# Patient Record
Sex: MALE | URBAN - METROPOLITAN AREA
[De-identification: names, ages, dates, MRNs, and addresses within clinical notes are randomized per-mention and may not be internally consistent; named-entity substitution may affect disease eponyms.]

---

## 2023-03-20 ENCOUNTER — TELEPHONE (OUTPATIENT)
Dept: CARDIOLOGY CLINIC | Age: 73
End: 2023-03-20

## 2023-03-20 NOTE — TELEPHONE ENCOUNTER
Patient was referred by Dr. Terra Yoo  to the [facility] location with Dr. Jorge Lopez. Patient has been scheduled for 03/30 at 2:00pm (am/pm). Patient verbalized understanding of appointment instructions. Call complete.

## 2023-03-27 NOTE — PROGRESS NOTES
Historical Provider, MD   nitroGLYCERIN (NITROSTAT) 0.4 MG SL tablet Place 0.4 mg under the tongue every 5 minutes as needed 3/27/23  Yes Historical Provider, MD   albuterol sulfate HFA (PROVENTIL;VENTOLIN;PROAIR) 108 (90 Base) MCG/ACT inhaler Inhale 2 puffs into the lungs every 6 hours as needed 2/16/17  Yes Historical Provider, MD   albuterol (PROVENTIL) (2.5 MG/3ML) 0.083% nebulizer solution Inhale 2.5 mg into the lungs every 6 hours as needed 2/16/17  Yes Historical Provider, MD   fluticasone-salmeterol (ADVAIR) 250-50 MCG/ACT AEPB diskus inhaler Inhale 1 puff into the lungs as needed 1/7/19  Yes Historical Provider, MD   HYDROcodone-acetaminophen (NORCO) 5-325 MG per tablet Take 1 tablet by mouth every 6 hours as needed. Yes Historical Provider, MD   meclizine (ANTIVERT) 25 MG tablet Take 25 mg by mouth every 6 hours as needed 2/2/19  Yes Historical Provider, MD   metFORMIN (GLUCOPHAGE) 1000 MG tablet Take 1,000 mg by mouth daily (with breakfast) 9/8/20  Yes Historical Provider, MD   esomeprazole (NEXIUM) 40 MG delayed release capsule Take 1 capsule by mouth daily 12/30/19  Yes Historical Provider, MD        Allergies:  Patient has no known allergies. Review of Systems:   Constitutional: there has been no unanticipated weight loss. There's been no change in energy level, sleep pattern, or activity level. Eyes: No visual changes or diplopia. No scleral icterus. ENT: No Headaches, hearing loss or vertigo. No mouth sores or sore throat. Cardiovascular: Reviewed in HPI  Respiratory: No cough or wheezing, no sputum production. No hematemesis. Gastrointestinal: No abdominal pain, appetite loss, blood in stools. No change in bowel or bladder habits. Genitourinary: No dysuria, trouble voiding, or hematuria. Musculoskeletal:  No gait disturbance, weakness or joint complaints. Integumentary: No rash or pruritis. Neurological: No headache, diplopia, change in muscle strength, numbness or tingling.  No

## 2023-03-30 ENCOUNTER — TELEPHONE (OUTPATIENT)
Dept: CARDIOLOGY CLINIC | Age: 73
End: 2023-03-30

## 2023-03-30 ENCOUNTER — OFFICE VISIT (OUTPATIENT)
Dept: CARDIOLOGY CLINIC | Age: 73
End: 2023-03-30
Payer: MEDICARE

## 2023-03-30 VITALS
HEART RATE: 55 BPM | SYSTOLIC BLOOD PRESSURE: 132 MMHG | WEIGHT: 206 LBS | OXYGEN SATURATION: 98 % | DIASTOLIC BLOOD PRESSURE: 64 MMHG

## 2023-03-30 DIAGNOSIS — E78.5 HYPERLIPIDEMIA, UNSPECIFIED HYPERLIPIDEMIA TYPE: ICD-10-CM

## 2023-03-30 DIAGNOSIS — I10 HYPERTENSION, UNSPECIFIED TYPE: ICD-10-CM

## 2023-03-30 DIAGNOSIS — I25.83 CORONARY ARTERY DISEASE DUE TO LIPID RICH PLAQUE: ICD-10-CM

## 2023-03-30 DIAGNOSIS — I25.10 CORONARY ARTERY DISEASE DUE TO LIPID RICH PLAQUE: ICD-10-CM

## 2023-03-30 DIAGNOSIS — R55 SYNCOPE, UNSPECIFIED SYNCOPE TYPE: Primary | ICD-10-CM

## 2023-03-30 PROCEDURE — 3075F SYST BP GE 130 - 139MM HG: CPT | Performed by: INTERNAL MEDICINE

## 2023-03-30 PROCEDURE — 99204 OFFICE O/P NEW MOD 45 MIN: CPT | Performed by: INTERNAL MEDICINE

## 2023-03-30 PROCEDURE — 1123F ACP DISCUSS/DSCN MKR DOCD: CPT | Performed by: INTERNAL MEDICINE

## 2023-03-30 PROCEDURE — 93000 ELECTROCARDIOGRAM COMPLETE: CPT | Performed by: INTERNAL MEDICINE

## 2023-03-30 PROCEDURE — 3078F DIAST BP <80 MM HG: CPT | Performed by: INTERNAL MEDICINE

## 2023-03-30 RX ORDER — ALBUTEROL SULFATE 2.5 MG/3ML
2.5 SOLUTION RESPIRATORY (INHALATION) EVERY 6 HOURS PRN
COMMUNITY
Start: 2017-02-16

## 2023-03-30 RX ORDER — MECLIZINE HCL 12.5 MG/1
12.5 TABLET ORAL 3 TIMES DAILY PRN
COMMUNITY
Start: 2019-03-19 | End: 2023-03-30

## 2023-03-30 RX ORDER — ALBUTEROL SULFATE 90 UG/1
2 AEROSOL, METERED RESPIRATORY (INHALATION) EVERY 6 HOURS PRN
COMMUNITY
Start: 2017-02-16

## 2023-03-30 RX ORDER — ASPIRIN 81 MG/1
81 TABLET ORAL DAILY
COMMUNITY
Start: 2016-02-24

## 2023-03-30 RX ORDER — ESOMEPRAZOLE MAGNESIUM 40 MG/1
1 CAPSULE, DELAYED RELEASE ORAL DAILY
COMMUNITY
Start: 2019-12-30

## 2023-03-30 RX ORDER — MECLIZINE HYDROCHLORIDE 25 MG/1
25 TABLET ORAL EVERY 6 HOURS PRN
COMMUNITY
Start: 2019-02-02

## 2023-03-30 RX ORDER — NITROGLYCERIN 0.4 MG/1
0.4 TABLET SUBLINGUAL EVERY 5 MIN PRN
COMMUNITY
Start: 2023-03-27

## 2023-03-30 RX ORDER — HYDROCODONE BITARTRATE AND ACETAMINOPHEN 5; 325 MG/1; MG/1
1 TABLET ORAL EVERY 6 HOURS PRN
COMMUNITY

## 2023-03-30 RX ORDER — ATORVASTATIN CALCIUM 40 MG/1
40 TABLET, FILM COATED ORAL NIGHTLY
COMMUNITY
Start: 2023-03-27

## 2023-03-30 RX ORDER — FLUTICASONE PROPIONATE AND SALMETEROL 250; 50 UG/1; UG/1
1 POWDER RESPIRATORY (INHALATION) PRN
COMMUNITY
Start: 2019-01-07

## 2023-03-30 NOTE — PATIENT INSTRUCTIONS
30 day heart monitor  Continue risk factor modifications. Call for any change in symptoms, call to report any changes in shortness of breath or development of chest pain with activity.     Follow up in 3 mos

## 2023-03-31 NOTE — TELEPHONE ENCOUNTER
Called Danielle ([phone]) medical records for [facility]. Awaiting report to be faxed.

## 2023-04-03 ENCOUNTER — TELEPHONE (OUTPATIENT)
Dept: CARDIOLOGY CLINIC | Age: 73
End: 2023-04-03

## 2023-04-03 PROBLEM — E78.5 HLD (HYPERLIPIDEMIA): Status: ACTIVE | Noted: 2023-04-03

## 2023-04-03 PROBLEM — R42 DIZZINESS: Status: ACTIVE | Noted: 2023-04-03

## 2023-04-03 PROBLEM — I10 HTN (HYPERTENSION): Status: ACTIVE | Noted: 2023-04-03

## 2023-04-03 PROBLEM — I25.10 CAD (CORONARY ARTERY DISEASE): Status: ACTIVE | Noted: 2023-04-03

## 2023-04-03 PROBLEM — R55 SYNCOPE: Status: ACTIVE | Noted: 2023-04-03

## 2023-04-03 NOTE — TELEPHONE ENCOUNTER
Spoke to Rell and his wife, Zackery Guerrier. Rell is not experiencing any worsening symptoms than he's been having over the last 6 mos despite pause noted on cardiac monitor 3/31/23. Discussed with Iván Wolff RN who will schedule Rell with Dr Poonam Blanchard 4/4/23 at 1:15 pm - Rell aware and agreeable. Pt instructed to proceed to ER is any worsening symptoms occur , pt verbalizes understanding.

## 2023-04-03 NOTE — PROGRESS NOTES
Summary:   Left ventricular wall motion is normal.   Overall left ventricular ejection fraction is estimated to be 55-60%. Left atrium is mildly dilated. Mitral valve leaflets are mildly thickened in appearance. Reason for Study: GUO     Cardiac Calcium Score  03/17/2023   FINDINGS:   THORACIC AORTA:  Unremarkable. No aneurysm   Ascending Aorta: 3.5 cm  (normal is less than 4 cm)   Descending Aorta: 2.8 cm  (normal is less than 3.5 cm)     AORTIC VALVE CALCIFICATION: None   HEART/PERICARDIUM:  Unremarkable     (The following structures are only partially included on this scan)   LUNGS/AIRWAYS:  Unremarkable. No air space disease or mass   PLEURA: Unremarkable. No pleural effusion or pneumothorax   MEDIASTINUM/JO:  Unremarkable   CHEST WALL/LOWER NECK:  Unremarkable   UPPER ABDOMEN:  Unremarkable   BONES:  Unremarkable   OTHER:  None         CORONARY ARTERY CALCIUM SCORE:   ARTERY    SCORE     LM                93. 2   LAD              414.8   CX                71.3   RCA             393.4     TOTAL         972.8     REFERENCE NORMS FOR CALCIUM SCORE   Mild: 1-100   Moderate: 101-300   Severe: 301-1000   Extensive: > 1000    I independently reviewed the cardiac diagnostic studies, ECG and relevant imaging studies. Physical Examination:  Vitals:    04/04/23 1308   BP: 110/72   Pulse: 55   SpO2: 99%     Wt Readings from Last 3 Encounters:   03/30/23 206 lb (93.4 kg)     Constitutional: Oriented. No distress. Cardiovascular: Normal rate, regular rhythm, S1&S2. Pulmonary/Chest: Bilateral respiratory sounds. No rhonchi. Abdominal: Soft. No tenderness   Musculoskeletal: No edema    Neurological: Alert and oriented. Follows command    Review of System:  [x] Full ROS obtained and negative except as mentioned in HPI    Prior to Admission medications    Medication Sig Start Date End Date Taking?  Authorizing Provider   fluticasone (FLONASE) 50 MCG/ACT nasal spray 1 spray by Nasal route daily 6/20/18

## 2023-04-03 NOTE — TELEPHONE ENCOUNTER
Attempted to call patient for urgent MCOT 5.5 secondpause. Unable to reach on cell or home phone. Pia Oh RN notified.

## 2023-04-04 ENCOUNTER — OFFICE VISIT (OUTPATIENT)
Dept: CARDIOLOGY CLINIC | Age: 73
End: 2023-04-04
Payer: MEDICARE

## 2023-04-04 VITALS
OXYGEN SATURATION: 99 % | HEART RATE: 55 BPM | HEIGHT: 68 IN | WEIGHT: 202.6 LBS | DIASTOLIC BLOOD PRESSURE: 72 MMHG | BODY MASS INDEX: 30.71 KG/M2 | SYSTOLIC BLOOD PRESSURE: 110 MMHG

## 2023-04-04 DIAGNOSIS — R42 DIZZINESS: ICD-10-CM

## 2023-04-04 DIAGNOSIS — I25.10 CORONARY ARTERY DISEASE INVOLVING NATIVE HEART WITHOUT ANGINA PECTORIS, UNSPECIFIED VESSEL OR LESION TYPE: ICD-10-CM

## 2023-04-04 DIAGNOSIS — R55 SYNCOPE, UNSPECIFIED SYNCOPE TYPE: ICD-10-CM

## 2023-04-04 DIAGNOSIS — E78.5 HYPERLIPIDEMIA, UNSPECIFIED HYPERLIPIDEMIA TYPE: ICD-10-CM

## 2023-04-04 DIAGNOSIS — I10 HYPERTENSION, UNSPECIFIED TYPE: ICD-10-CM

## 2023-04-04 PROCEDURE — 1123F ACP DISCUSS/DSCN MKR DOCD: CPT | Performed by: INTERNAL MEDICINE

## 2023-04-04 PROCEDURE — 93000 ELECTROCARDIOGRAM COMPLETE: CPT | Performed by: INTERNAL MEDICINE

## 2023-04-04 PROCEDURE — 3078F DIAST BP <80 MM HG: CPT | Performed by: INTERNAL MEDICINE

## 2023-04-04 PROCEDURE — 3074F SYST BP LT 130 MM HG: CPT | Performed by: INTERNAL MEDICINE

## 2023-04-04 PROCEDURE — 99205 OFFICE O/P NEW HI 60 MIN: CPT | Performed by: INTERNAL MEDICINE

## 2023-04-04 RX ORDER — ESOMEPRAZOLE MAGNESIUM 20 MG/1
FOR SUSPENSION ORAL PRN
COMMUNITY

## 2023-04-04 RX ORDER — LISINOPRIL AND HYDROCHLOROTHIAZIDE 20; 12.5 MG/1; MG/1
TABLET ORAL
COMMUNITY
Start: 2023-03-08

## 2023-04-04 RX ORDER — SUMATRIPTAN 50 MG/1
TABLET, FILM COATED ORAL PRN
COMMUNITY

## 2023-04-04 RX ORDER — LAMOTRIGINE 25 MG/1
TABLET ORAL
COMMUNITY
Start: 2023-03-29

## 2023-04-04 RX ORDER — FLUTICASONE PROPIONATE 50 MCG
1 SPRAY, SUSPENSION (ML) NASAL DAILY
COMMUNITY
Start: 2018-06-20

## 2023-04-04 RX ORDER — TOPIRAMATE 25 MG/1
25 TABLET ORAL DAILY
Qty: 30 TABLET | Refills: 11 | COMMUNITY
Start: 2022-12-30 | End: 2023-12-30

## 2023-04-04 RX ORDER — ROSUVASTATIN CALCIUM 10 MG/1
TABLET, COATED ORAL
COMMUNITY
Start: 2023-03-08 | End: 2023-04-04 | Stop reason: DRUGHIGH

## 2023-04-04 RX ORDER — HYDRALAZINE HYDROCHLORIDE 10 MG/1
10 TABLET, FILM COATED ORAL 3 TIMES DAILY
COMMUNITY
Start: 2023-03-08

## 2023-04-04 RX ORDER — ROSUVASTATIN CALCIUM 40 MG/1
TABLET, COATED ORAL
COMMUNITY
Start: 2023-03-29 | End: 2023-04-04

## 2023-04-04 RX ORDER — GLIPIZIDE 5 MG/1
TABLET ORAL
COMMUNITY
Start: 2022-05-26

## 2023-04-04 RX ORDER — DULAGLUTIDE 0.75 MG/.5ML
0.75 INJECTION, SOLUTION SUBCUTANEOUS WEEKLY
COMMUNITY

## 2023-04-04 RX ORDER — CARVEDILOL 25 MG/1
TABLET ORAL
COMMUNITY
Start: 2023-03-08

## 2023-04-04 RX ORDER — AMLODIPINE BESYLATE 10 MG/1
TABLET ORAL
COMMUNITY
Start: 2023-03-29

## 2023-04-04 NOTE — TELEPHONE ENCOUNTER
Faxed request to [place] for patients [facility] films. fax #[phone] awaiting response.

## 2023-04-04 NOTE — LETTER
[address]  EP Procedure Sheet    4/4/23  Clint Del Cid  1950  [id number]  EP Procedures  [x] Pacemaker implant (dual) [] EP Study   [] ICD implant (single/dual) [] Atrial flutter ablation (SKIP Y/N)   [] Biv implant ICD [] Tilt Table   [] Biv implant PPM [] Atrial fibrillation ablation (SKIP Yes)   [] Generator Change (PPM/ICD/BiV) [] SVT ablation   [] Lead revision (RV/LA/RA) (<1 month) [] PVC ablation     [] Lead extraction +/- upgrade (BiV/PPM/ICD) [] VT Ischemic/ non-ischemic   [] Loop implant/ removal [] VT RVOT   [] Cardioversion [] VT Left sided   [] SKIP [] AVN ablation   Equipment  [] Medtronic  [] KIMI Mapping System   [] [place] [] [address]   [] [place] [employer] [] CryoAblation   [] Biotronik [] Laser Lead Extraction   EP Procedures Scheduling Request  # hours Requested  [x]1 []2 []2-4 [] 4-6 Scheduled  Date:   Specific Day  Completed    Anesthesia []yes [x]no F/u Date:   CT surgery backup []yes [x]no     Overnight stay      Performing MD [x]RMM []MXA   []MKW [] CMV First vs repeat   [x]1st [] 2nd [] 3rd   Pre-Procedure Labs / Imaging  [] PT/INR [] Type & cross   [] CBC [] Units PRBC   [] BMP/Mg [] Units FFP   [] Venogram [] Cardiac CTA for Pulmonary vein mapping     RN INITIALS: DW     Patient Instructions  Do not eat or drink after midnight the night prior to procedure  Dx:Sinus node dysfunction   ICD-10 code: r00.1

## 2023-04-04 NOTE — PATIENT INSTRUCTIONS
You are scheduled for a pacemaker     Our  will call you to discuss a date for you procedure. The Cath Lab will call you a week before your procedure. The night before your procedure you will need to scrub with Hibiclens wash. The day of your procedure you will need to check in at the registration desk, which is in the main lobby at [address] [place] [address] will need to fast for at least 8 hours prior to your procedure. You may take all other medications with a sip of water the morning of your procedure. Please have a responsible adult to drive you home upon discharge. The discharging unit will be giving you discharge instructions. If you have any questions regarding your procedure itself or your medications, please call [phone] and ask to talk to an EP nurse. You will be seen in the office in 1 week for a wound check and then 3 months following implantation. Post-Device Implant     1. Wound Care  -Bathe daily but keep incision dry and clean until your 7-10 day follow up  -Do not shower until you are told to do so by your physician.  -Do not remove the outer dressing unless it is soiled  -Allow the thin pieces of tape (Steri-Strips) to fall off naturally. Do not pick or pull at these unless instructed to do so by your physician. 2. Contact the cardiologists office for these concerns:   -Increased swelling and/or tenderness in incision and/or extending down the arm on the same side as implant site   -Feeling increased palpitations or irregular heart beat   -Redness of incision or drainage from incision.   -Bleeding that does not stop or increases.  -Skin pimples along incision.  -If a suture works its way through the incision.  -Fever greater than 100.5    3. Do not rub or twist the device site    4. Avoid lifting objects heavier than 10 pounds for one month.  Do not raise the arm on the side where the device was implanted over your head

## 2023-04-14 ENCOUNTER — TELEPHONE (OUTPATIENT)
Dept: CARDIOLOGY CLINIC | Age: 73
End: 2023-04-14

## 2023-04-21 ENCOUNTER — APPOINTMENT (OUTPATIENT)
Dept: GENERAL RADIOLOGY | Age: 73
End: 2023-04-21
Attending: INTERNAL MEDICINE
Payer: MEDICARE

## 2023-04-21 ENCOUNTER — NURSE ONLY (OUTPATIENT)
Dept: CARDIOLOGY CLINIC | Age: 73
End: 2023-04-21

## 2023-04-21 ENCOUNTER — HOSPITAL ENCOUNTER (OUTPATIENT)
Dept: CARDIAC CATH/INVASIVE PROCEDURES | Age: 73
Discharge: HOME | End: 2023-04-21
Attending: INTERNAL MEDICINE | Admitting: INTERNAL MEDICINE
Payer: MEDICARE

## 2023-04-21 VITALS
DIASTOLIC BLOOD PRESSURE: 77 MMHG | WEIGHT: 202 LBS | OXYGEN SATURATION: 99 % | BODY MASS INDEX: 30.62 KG/M2 | TEMPERATURE: 98 F | HEIGHT: 68 IN | HEART RATE: 69 BPM | SYSTOLIC BLOOD PRESSURE: 143 MMHG

## 2023-04-21 PROBLEM — I49.5 SINUS NODE DYSFUNCTION (HCC): Status: ACTIVE | Noted: 2023-04-21

## 2023-04-21 LAB
EKG ATRIAL RATE: 69 BPM
EKG DIAGNOSIS: NORMAL
EKG P AXIS: 75 DEGREES
EKG P-R INTERVAL: 202 MS
EKG Q-T INTERVAL: 400 MS
EKG QRS DURATION: 92 MS
EKG QTC CALCULATION (BAZETT): 428 MS
EKG R AXIS: 78 DEGREES
EKG T AXIS: 51 DEGREES
EKG VENTRICULAR RATE: 69 BPM

## 2023-04-21 PROCEDURE — 99152 MOD SED SAME PHYS/QHP 5/>YRS: CPT | Performed by: INTERNAL MEDICINE

## 2023-04-21 PROCEDURE — 93005 ELECTROCARDIOGRAM TRACING: CPT | Performed by: INTERNAL MEDICINE

## 2023-04-21 PROCEDURE — C1887 CATHETER, GUIDING: HCPCS

## 2023-04-21 PROCEDURE — 2580000003 HC RX 258

## 2023-04-21 PROCEDURE — 99153 MOD SED SAME PHYS/QHP EA: CPT

## 2023-04-21 PROCEDURE — C1785 PMKR, DUAL, RATE-RESP: HCPCS

## 2023-04-21 PROCEDURE — 33208 INSRT HEART PM ATRIAL & VENT: CPT | Performed by: INTERNAL MEDICINE

## 2023-04-21 PROCEDURE — C1769 GUIDE WIRE: HCPCS

## 2023-04-21 PROCEDURE — C1889 IMPLANT/INSERT DEVICE, NOC: HCPCS

## 2023-04-21 PROCEDURE — 93010 ELECTROCARDIOGRAM REPORT: CPT | Performed by: INTERNAL MEDICINE

## 2023-04-21 PROCEDURE — C1898 LEAD, PMKR, OTHER THAN TRANS: HCPCS

## 2023-04-21 PROCEDURE — 99152 MOD SED SAME PHYS/QHP 5/>YRS: CPT

## 2023-04-21 PROCEDURE — 2500000003 HC RX 250 WO HCPCS

## 2023-04-21 PROCEDURE — C1894 INTRO/SHEATH, NON-LASER: HCPCS

## 2023-04-21 PROCEDURE — 33208 INSRT HEART PM ATRIAL & VENT: CPT

## 2023-04-21 PROCEDURE — 71045 X-RAY EXAM CHEST 1 VIEW: CPT

## 2023-04-21 PROCEDURE — 6360000002 HC RX W HCPCS

## 2023-04-21 RX ORDER — SODIUM CHLORIDE 0.9 % (FLUSH) 0.9 %
5-40 SYRINGE (ML) INJECTION PRN
Status: DISCONTINUED | OUTPATIENT
Start: 2023-04-21 | End: 2023-04-21 | Stop reason: HOSPADM

## 2023-04-21 NOTE — DISCHARGE INSTRUCTIONS
body   -Sudden or severe headache without known cause   -Nausea with uncontrolled vomiting   -Severe bleeding

## 2023-04-21 NOTE — PROCEDURES
[address]     Electrophysiology Procedure Note       Date of Procedure: 4/21/2023  Patient's Name: Karsten Paz  YOB: 1950   Medical Record Number: [id number]  Procedure Performed by: Elmer Emery MD    Procedures performed:     Insertion of MRI compatible right ventricular pacing lead under fluoroscopy. Insertion of MRI compatible right atrial lead under fluoroscopy  Insertion of a MRI compatible dual chamber Pacemaker  IV sedation. Sedation start time: 10:47 AM  Sedation stop time: 11:55 AM  Fentanyl: 200 Mcg  Versed: 4 Mg  An independent trained nursing staff gave the medication under my supervision. Programming and analysis of dual chamber pacemaker. Indication of the procedure: Non-reversible symptomatic bradycardia, sinus node dysfunction, Karsten Paz is a 74 y.o. male presented to the hospital with sinus node dysfunction and bradycardia. Details of procedure: The patient was brought to the electrophysiology laboratory in stable condition. The patient was in a fasting and non-sedated state. The risks, benefits and alternatives of the procedure were discussed with the patient. The risks including, but not limited to, the risks of vascular injury, bleeding, infection, device malfunction, lead dislodgement, radiation exposure, injury to cardiac and surrounding structures (including pneumothorax), stroke, myocardial infarction and death were discussed in detail. Patient opted to proceed with the device implantation. Written informed consent was signed and placed in the chart. Prophylactic antibiotic was given. The patient was prepped and draped in a sterile fashion. A timeout protocol was completed to identify the patient and the procedure being performed. IV sedation was provided with IV Versed, Fentanyl. Central venous access into the left axillary vein was obtained using ultrasound guidance and modified Seldinger technique.  After central venous access was

## 2023-04-21 NOTE — H&P
Yes Historical Provider, MD   glipiZIDE (GLUCOTROL) 5 MG tablet 1/2 (ONE HALF) TABLET BY MOUTH TWO TIMES DAILY 5/26/22  Yes Historical Provider, MD   topiramate (TOPAMAX) 25 MG tablet Take 1 tablet by mouth daily 12/30/22 12/30/23 Yes Historical Provider, MD   esomeprazole Magnesium (NEXIUM) 20 MG PACK Take by mouth as needed    Historical Provider, MD   SUMAtriptan (IMITREX) 50 MG tablet Take by mouth as needed    Historical Provider, MD   amLODIPine (NORVASC) 10 MG tablet TAKE 1 TABLET BY MOUTH EVERY DAY 6 MONTH CHECKUP JUNE 2023 3/29/23   Historical Provider, MD   carvedilol (COREG) 25 MG tablet TAKE 1 (ONE) TABLET BY MOUTH TWO TIMES DAILY, ANNUAL WITH LABS JUNE 2023 3/8/23   Historical Provider, MD   Dulaglutide (TRULICITY) 6.59 FO/0.8TA SOPN Inject 0.75 mg into the skin once a week    Historical Provider, MD   hydrALAZINE (APRESOLINE) 10 MG tablet Take 1 tablet by mouth 3 times daily 3/8/23   Historical Provider, MD   lamoTRIgine (LAMICTAL) 25 MG tablet TAKE 1 TABLET BY MOUTH NIGHTLY FOR 2 WEEKS, THEN 2 TABLETS NIGHTLY FOR 2 WEEKS 3/29/23   Historical Provider, MD   lisinopril-hydroCHLOROthiazide (PRINZIDE;ZESTORETIC) 20-12.5 MG per tablet TAKE 1 (ONE) TABLET BY MOUTH TWICE A DAY, ANNUAL WITH LABS JUNE 2023 3/8/23   Historical Provider, MD   rosuvastatin (CRESTOR) 40 MG tablet TAKE 1 TABLET BY MOUTH EVERY DAY ANNUAL WITH LABS JUNE 2023 3/29/23   Historical Provider, MD   aspirin 81 MG EC tablet Take 81 mg by mouth daily 2/24/16   Historical Provider, MD   atorvastatin (LIPITOR) 40 MG tablet Take 40 mg by mouth nightly 3/27/23   Historical Provider, MD   nitroGLYCERIN (NITROSTAT) 0.4 MG SL tablet Place 0.4 mg under the tongue every 5 minutes as needed 3/27/23   Historical Provider, MD   albuterol sulfate HFA (PROVENTIL;VENTOLIN;PROAIR) 108 (90 Base) MCG/ACT inhaler Inhale 2 puffs into the lungs every 6 hours as needed 2/16/17   Historical Provider, MD   albuterol (PROVENTIL) (2.5 MG/3ML) 0.083% nebulizer

## 2023-04-25 DIAGNOSIS — Z95.0 PACEMAKER: Primary | ICD-10-CM

## 2023-04-25 DIAGNOSIS — I49.5 SINUS NODE DYSFUNCTION (HCC): ICD-10-CM

## 2023-05-01 ENCOUNTER — NURSE ONLY (OUTPATIENT)
Dept: CARDIOLOGY CLINIC | Age: 73
End: 2023-05-01
Payer: MEDICARE

## 2023-05-01 DIAGNOSIS — I49.5 SINUS NODE DYSFUNCTION (HCC): ICD-10-CM

## 2023-05-01 DIAGNOSIS — Z95.0 PACEMAKER: ICD-10-CM

## 2023-05-01 PROCEDURE — 93280 PM DEVICE PROGR EVAL DUAL: CPT | Performed by: INTERNAL MEDICINE

## 2023-05-08 ENCOUNTER — TELEPHONE (OUTPATIENT)
Dept: CARDIOLOGY CLINIC | Age: 73
End: 2023-05-08

## 2023-05-08 ENCOUNTER — NURSE ONLY (OUTPATIENT)
Dept: CARDIOLOGY CLINIC | Age: 73
End: 2023-05-08
Payer: MEDICARE

## 2023-05-08 ENCOUNTER — HOSPITAL ENCOUNTER (OUTPATIENT)
Dept: VASCULAR LAB | Age: 73
Discharge: HOME | End: 2023-05-08
Payer: MEDICARE

## 2023-05-08 DIAGNOSIS — M79.89 SWELLING OF LEFT UPPER EXTREMITY: ICD-10-CM

## 2023-05-08 DIAGNOSIS — Z95.0 PACEMAKER: Primary | ICD-10-CM

## 2023-05-08 DIAGNOSIS — Z95.0 PACEMAKER: ICD-10-CM

## 2023-05-08 DIAGNOSIS — M79.89 LEFT UPPER EXTREMITY SWELLING: Primary | ICD-10-CM

## 2023-05-08 DIAGNOSIS — I49.5 SINUS NODE DYSFUNCTION (HCC): ICD-10-CM

## 2023-05-08 PROCEDURE — 93280 PM DEVICE PROGR EVAL DUAL: CPT | Performed by: INTERNAL MEDICINE

## 2023-05-08 PROCEDURE — 93971 EXTREMITY STUDY: CPT

## 2023-05-08 NOTE — TELEPHONE ENCOUNTER
Pt spouse states pt has a very swollen left hand. Pt had pacemaker placed 2 weeks ago. Spouse is asking for a call back to discuss and make sure pt is ok. Please call and advise.

## 2023-05-08 NOTE — TELEPHONE ENCOUNTER
Spoke with pt's wife, she said he just noticed it this morning, it's not warm but is slightly painful. Please advise.

## 2023-05-08 NOTE — PROGRESS NOTES
Patient comes in for programming evaluation for his pacemaker. S/p Medtronic [id number] [place] XT DR MRI on 4/21/2023 with Dr. Quarles Fraction  Non-reversible symptomatic bradycardia, sinus node dysfunction,    Patient came into the office today do to left arm swelling. Sent for STAT Doppler  Left  Acute totally occluded deep vein thrombosis involving the left subclavian,  axillary, and brachial veins. Patient will start Eliquis today. All sensing and pacing parameters are within normal range. Battery life 14.3 years    AP 16.1%.  0.1%. No episodes noted. Incision still healing nicely. Please see interrogation for more detail. Patient will follow up on 6/1/2023 with NPSR.

## 2023-05-23 ENCOUNTER — NURSE ONLY (OUTPATIENT)
Dept: CARDIOLOGY CLINIC | Age: 73
End: 2023-05-23
Payer: MEDICARE

## 2023-05-23 DIAGNOSIS — I49.5 SINUS NODE DYSFUNCTION (HCC): ICD-10-CM

## 2023-05-23 DIAGNOSIS — Z95.0 PACEMAKER: ICD-10-CM

## 2023-05-23 PROCEDURE — 93294 REM INTERROG EVL PM/LDLS PM: CPT | Performed by: INTERNAL MEDICINE

## 2023-05-23 PROCEDURE — 93296 REM INTERROG EVL PM/IDS: CPT | Performed by: INTERNAL MEDICINE

## 2023-05-24 NOTE — PROGRESS NOTES
We received remote transmission from patient's monitor at home. Transmission shows normal sensing and pacing function. EP physician will review. See interrogation under cardiology tab in the [address] [place] [address] [address] field for more details.  < 0.1% (MVP On)  AP 15.6%    End of 91-day monitoring period 5-23-23.

## 2023-05-31 NOTE — PROGRESS NOTES
Patient comes in for programming evaluation for his pacemaker. S/p Medtronic [id number] [place] XT DR BEARD on 4/21/2023 with Dr. Peggy Mckeon  Non-reversible symptomatic bradycardia, sinus node dysfunction    Patient had-5/8/2023-Acute totally occluded deep vein thrombosis involving the left subclavian,  axillary, and brachial veins. All sensing and pacing parameters are within normal range. Battery life 14.4 years    AP 15.6%.  <0.1%. No episodes noted. Patient remains on Eliquis. No changes made this Session. Please see interrogation for more detail. Patient will see NPSR today and follow up in office or remotely in 3 months.

## 2023-06-01 ENCOUNTER — NURSE ONLY (OUTPATIENT)
Dept: CARDIOLOGY CLINIC | Age: 73
End: 2023-06-01
Payer: MEDICARE

## 2023-06-01 ENCOUNTER — OFFICE VISIT (OUTPATIENT)
Dept: CARDIOLOGY CLINIC | Age: 73
End: 2023-06-01

## 2023-06-01 VITALS
OXYGEN SATURATION: 97 % | HEIGHT: 68 IN | WEIGHT: 199 LBS | BODY MASS INDEX: 30.16 KG/M2 | SYSTOLIC BLOOD PRESSURE: 116 MMHG | HEART RATE: 80 BPM | DIASTOLIC BLOOD PRESSURE: 62 MMHG

## 2023-06-01 DIAGNOSIS — I10 HYPERTENSION, UNSPECIFIED TYPE: ICD-10-CM

## 2023-06-01 DIAGNOSIS — I25.10 CORONARY ARTERY DISEASE INVOLVING NATIVE HEART WITHOUT ANGINA PECTORIS, UNSPECIFIED VESSEL OR LESION TYPE: ICD-10-CM

## 2023-06-01 DIAGNOSIS — E78.5 HYPERLIPIDEMIA, UNSPECIFIED HYPERLIPIDEMIA TYPE: ICD-10-CM

## 2023-06-01 DIAGNOSIS — Z95.0 PACEMAKER: ICD-10-CM

## 2023-06-01 DIAGNOSIS — I49.5 SINUS NODE DYSFUNCTION (HCC): ICD-10-CM

## 2023-06-01 DIAGNOSIS — I49.5 SINUS NODE DYSFUNCTION (HCC): Primary | ICD-10-CM

## 2023-06-01 PROCEDURE — 93280 PM DEVICE PROGR EVAL DUAL: CPT | Performed by: INTERNAL MEDICINE

## 2023-06-01 RX ORDER — HYDRALAZINE HYDROCHLORIDE 10 MG/1
10 TABLET, FILM COATED ORAL DAILY
Qty: 90 TABLET | Refills: 0 | Status: SHIPPED
Start: 2023-06-01

## 2023-06-01 RX ORDER — LISINOPRIL 20 MG/1
20 TABLET ORAL DAILY
Qty: 30 TABLET | Refills: 5 | Status: SHIPPED | OUTPATIENT
Start: 2023-06-01

## 2023-07-10 NOTE — TELEPHONE ENCOUNTER
Sample requested: Eliquis    Strength: 5mg    Dosage: 1 talet 2 times daily    Patient's call back number: [phone]    Pt may be stopping med per NPSR, would like samples until advised to continue or not. Pt lives 1 1/2 hr away and wanted to know if they could wait.

## 2023-08-08 RX ORDER — LISINOPRIL 20 MG/1
20 TABLET ORAL DAILY
Qty: 90 TABLET | Refills: 5 | OUTPATIENT
Start: 2023-08-08

## 2023-08-28 ENCOUNTER — OFFICE VISIT (OUTPATIENT)
Dept: NEUROLOGY | Age: 73
End: 2023-08-28
Payer: MEDICARE

## 2023-08-28 VITALS
BODY MASS INDEX: 30.71 KG/M2 | HEART RATE: 80 BPM | DIASTOLIC BLOOD PRESSURE: 79 MMHG | SYSTOLIC BLOOD PRESSURE: 136 MMHG | WEIGHT: 202 LBS

## 2023-08-28 DIAGNOSIS — I10 HTN (HYPERTENSION), BENIGN: ICD-10-CM

## 2023-08-28 DIAGNOSIS — R27.0 ATAXIA: ICD-10-CM

## 2023-08-28 DIAGNOSIS — H81.13 BENIGN PAROXYSMAL VERTIGO OF BOTH EARS: Primary | ICD-10-CM

## 2023-08-28 DIAGNOSIS — H81.13 BENIGN PAROXYSMAL VERTIGO OF BOTH EARS: ICD-10-CM

## 2023-08-28 DIAGNOSIS — E13.8 DM (DIABETES MELLITUS), SECONDARY, WITH COMPLICATIONS (HCC): ICD-10-CM

## 2023-08-28 PROCEDURE — 3078F DIAST BP <80 MM HG: CPT | Performed by: PSYCHIATRY & NEUROLOGY

## 2023-08-28 PROCEDURE — 99204 OFFICE O/P NEW MOD 45 MIN: CPT | Performed by: PSYCHIATRY & NEUROLOGY

## 2023-08-28 PROCEDURE — 3075F SYST BP GE 130 - 139MM HG: CPT | Performed by: PSYCHIATRY & NEUROLOGY

## 2023-08-28 PROCEDURE — 1123F ACP DISCUSS/DSCN MKR DOCD: CPT | Performed by: PSYCHIATRY & NEUROLOGY

## 2023-08-28 NOTE — PROGRESS NOTES
The patient is a 74y.o. years old male who  was referred by BENTON Sanford CNP  for consultation regarding recurrent dizziness. HPI:  The patient describes history of chronic and recurrent dizziness for at least 2 or 3 years. Description weekly episodes of feeling spinning sensation with either head or body movement either direction with nausea and ataxia with no fall or injury. Degree can be severe and duration is minutes. No vomiting or falling or injury. It can affect or interfere with ADL. Gets worse with changing of direction. History of multiple ear surgeries in the past.  History of hypertension and diabetes with neuropathy. Takes Eliquis for history of A-fib. Recent MRI of the brain in March showed no abnormal lesions. Tried meclizine in the past without improvement. He does check his blood pressure at home which has been controlled. No recent change in his medications. He denies any symptoms but does have chronic kidney impairment. He is on blood pressure medicine and diabetic medications. No history of strokes or head trauma with LOC. Other review of system was unremarkable. ROS : A 10-14 system review of constitutional, cardiovascular, respiratory, GI, eyes, , ENT, musculoskeletal, endocrine, skin, SHEENT, genitourinary, psychiatric and neurologic systems was obtained and updated today and is unremarkable except as mentioned in my HPI        Exam:   Constitutional:   Vitals:    08/28/23 1159   BP: 136/79   Pulse: 80   Weight: 202 lb (91.6 kg)       General appearance:  Normal development and appear in no acute distress. Mental Status:   Oriented to person, place, problem, and time. Memory: Good immediate recall. Intact remote memory  Normal attention span and concentration. Language: intact naming, repeating and fluency   Good fund of Knowledge. Aware of current events and vocabulary   Cranial Nerves:   II: Visual fields: Full.  Pupils: equal, round, reactive

## 2023-08-29 LAB
FOLATE SERPL-MCNC: 14.69 NG/ML (ref 4.78–24.2)
VIT B12 SERPL-MCNC: 290 PG/ML (ref 211–911)

## 2023-09-01 PROCEDURE — 93296 REM INTERROG EVL PM/IDS: CPT | Performed by: INTERNAL MEDICINE

## 2023-09-01 PROCEDURE — 93294 REM INTERROG EVL PM/LDLS PM: CPT | Performed by: INTERNAL MEDICINE

## 2023-09-02 NOTE — PROGRESS NOTES
[facility]   Electrophysiology  DANELLE Thompson  Attending EP: Dr. Kristel Cooper    Date: 10/2/2023  I had the privilege of visiting Isis Gallegos in the office. Chief Complaint:   Chief Complaint   Patient presents with    Follow-up     No Cardiac Symptoms     History of Present Illness: History obtained from patient and medical record. Isis Gallegos is 74 y.o. male with a past medical history of HTN, CAD, asthma/COPD, and DM. A holter showed long sinus pauses. S/p PPM (4/21/23). He developed left arm swelling s/p PPM placement and found to have acute DVT. -Interval history: Today, Isis Gallegos is being seen for routine follow up. His wife is present for the visit. He is doing pretty well. No further syncope since having his pacemaker placed in April. His device is functioning normally. No arrhythmia issues. He continues to have dizzy spells and was recently diagnosed with BPPV with plan to start vestibular therapy. He had pneumonia in July, but is feeling back to his baseline. Denies having chest pain, palpitations, shortness of breath, orthopnea/PND, cough, or dizziness at the time of this visit. With regard to medication therapy the patient has been compliant with prescribed regimen. They have tolerated therapy to date. Allergies: Allergies   Allergen Reactions    Ciprofloxacin Headaches, Other (See Comments) and Rash     Severe headache      Sulfasalazine Rash    Flonase [Fluticasone] Other (See Comments)     Makes him mean    Percocet [Oxycodone-Acetaminophen] Rash    Sulfa Antibiotics Rash     Home Medications:  Prior to Visit Medications    Medication Sig Taking?  Authorizing Provider   apixaban (ELIQUIS) 5 MG TABS tablet Take 1 tablet by mouth 2 times daily Yes BENTON Thompson CNP   lisinopril (PRINIVIL;ZESTRIL) 20 MG tablet Take 1 tablet by mouth daily Yes BENTON Thompson CNP   hydrALAZINE (APRESOLINE) 10 MG tablet Take 1 tablet by mouth daily Yes BENTON Thompson

## 2023-10-02 ENCOUNTER — OFFICE VISIT (OUTPATIENT)
Dept: CARDIOLOGY CLINIC | Age: 73
End: 2023-10-02
Payer: MEDICARE

## 2023-10-02 ENCOUNTER — NURSE ONLY (OUTPATIENT)
Dept: CARDIOLOGY CLINIC | Age: 73
End: 2023-10-02
Payer: MEDICARE

## 2023-10-02 VITALS
BODY MASS INDEX: 30.63 KG/M2 | HEART RATE: 69 BPM | OXYGEN SATURATION: 98 % | WEIGHT: 202.1 LBS | HEIGHT: 68 IN | DIASTOLIC BLOOD PRESSURE: 68 MMHG | SYSTOLIC BLOOD PRESSURE: 130 MMHG

## 2023-10-02 DIAGNOSIS — Z95.0 PACEMAKER: Primary | ICD-10-CM

## 2023-10-02 DIAGNOSIS — I10 HYPERTENSION, UNSPECIFIED TYPE: ICD-10-CM

## 2023-10-02 DIAGNOSIS — R55 SYNCOPE, UNSPECIFIED SYNCOPE TYPE: ICD-10-CM

## 2023-10-02 DIAGNOSIS — I25.10 CORONARY ARTERY DISEASE INVOLVING NATIVE HEART WITHOUT ANGINA PECTORIS, UNSPECIFIED VESSEL OR LESION TYPE: ICD-10-CM

## 2023-10-02 DIAGNOSIS — I49.5 SINUS NODE DYSFUNCTION (HCC): ICD-10-CM

## 2023-10-02 DIAGNOSIS — I49.5 SINUS NODE DYSFUNCTION (HCC): Primary | ICD-10-CM

## 2023-10-02 DIAGNOSIS — Z95.0 PACEMAKER: ICD-10-CM

## 2023-10-02 PROCEDURE — 99214 OFFICE O/P EST MOD 30 MIN: CPT | Performed by: NURSE PRACTITIONER

## 2023-10-02 PROCEDURE — 93280 PM DEVICE PROGR EVAL DUAL: CPT | Performed by: INTERNAL MEDICINE

## 2023-10-02 PROCEDURE — 1123F ACP DISCUSS/DSCN MKR DOCD: CPT | Performed by: NURSE PRACTITIONER

## 2023-10-02 PROCEDURE — 3078F DIAST BP <80 MM HG: CPT | Performed by: NURSE PRACTITIONER

## 2023-10-02 PROCEDURE — 3075F SYST BP GE 130 - 139MM HG: CPT | Performed by: NURSE PRACTITIONER

## 2023-10-02 PROCEDURE — 93000 ELECTROCARDIOGRAM COMPLETE: CPT | Performed by: NURSE PRACTITIONER

## 2023-10-04 ENCOUNTER — TELEPHONE (OUTPATIENT)
Dept: NEUROLOGY | Age: 73
End: 2023-10-04

## 2023-10-04 NOTE — TELEPHONE ENCOUNTER
LOV: 8/28/23  NOV: 11/2/23    Preferred pharmacy:     Name of caller: [facility] Ada    Reason for call: Wale Caballero called today to report that pt started therapy yesterday and all they did was work on his legs and lower body. 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 They need to be realigned. She called the therapy place today to talk to them about it and they told her that they need a letter from Dr Hunter Crews saying exactly what they need to work on. ( Upper body, head and neck to help realign the crystals )    Patient needs letter before next therapy session which is 10/12/23, they are not sure how many session they will get with insurance so they want to make sure he is getting the right therapy    The fax number to the therapy office is [phone]. Wife would like a status update on the letter.

## 2023-10-05 NOTE — TELEPHONE ENCOUNTER
Office received fax from [facility]  for patient's therapy plan. Please review and sign and fax back. Writer scanned the fax into media under this telephone encounter.

## 2023-11-01 ENCOUNTER — OFFICE VISIT (OUTPATIENT)
Dept: NEUROLOGY | Age: 73
End: 2023-11-01
Payer: MEDICARE

## 2023-11-01 VITALS
HEART RATE: 86 BPM | WEIGHT: 200 LBS | BODY MASS INDEX: 30.41 KG/M2 | DIASTOLIC BLOOD PRESSURE: 81 MMHG | SYSTOLIC BLOOD PRESSURE: 135 MMHG

## 2023-11-01 DIAGNOSIS — R27.0 ATAXIA: ICD-10-CM

## 2023-11-01 DIAGNOSIS — H81.13 BENIGN PAROXYSMAL VERTIGO OF BOTH EARS: Primary | ICD-10-CM

## 2023-11-01 DIAGNOSIS — E13.8 DM (DIABETES MELLITUS), SECONDARY, WITH COMPLICATIONS (HCC): ICD-10-CM

## 2023-11-01 DIAGNOSIS — I10 HTN (HYPERTENSION), BENIGN: ICD-10-CM

## 2023-11-01 PROCEDURE — 3079F DIAST BP 80-89 MM HG: CPT | Performed by: PSYCHIATRY & NEUROLOGY

## 2023-11-01 PROCEDURE — 1123F ACP DISCUSS/DSCN MKR DOCD: CPT | Performed by: PSYCHIATRY & NEUROLOGY

## 2023-11-01 PROCEDURE — 3075F SYST BP GE 130 - 139MM HG: CPT | Performed by: PSYCHIATRY & NEUROLOGY

## 2023-11-01 PROCEDURE — 99213 OFFICE O/P EST LOW 20 MIN: CPT | Performed by: PSYCHIATRY & NEUROLOGY

## 2023-11-01 NOTE — PROGRESS NOTES
The patient came today for follow up regarding: dizziness    Since the patient's last visit, he had PT and OT for vestibular training which did make him feel better. Now he feels less dizzy or unsteady. No falling since his last visit. He discontinued his Lamictal.  He continues to take Topamax 25 mg daily. He used to have migraines with aura in the past but none over the last several months. He is keeping an eye on his blood pressure and blood sugar. He denies any headache, chest pain, weakness or numbness. Other review of system was unremarkable. Exam:   Constitutional:   Vitals:    11/01/23 1045   BP: 135/81   Pulse: 86   Weight: 90.7 kg (200 lb)       General appearance:  Normal development and appear in no acute distress. Mental Status:   Oriented to person, place, problem, and time. Memory: Good immediate recall. Intact remote memory  Normal attention span and concentration. Language: intact naming, repeating and fluency   Good fund of Knowledge. Aware of current events and vocabulary   Cranial Nerves:   II: Pupils: equal, round, reactive to light  III,IV,VI: Extra Ocular Movements are intact. No nystagmus  V: Facial sensation is intact   VII: Facial strength and movements: intact and symmetric  XII: Tongue movements are normal  Musculoskeletal: 5/5 in all 4 extremities. Tone: Normal tone. Coordination: no tremors or drift  DTR: symmetric 2+ in the arms 1+ in the leg  Sensation: normal to all modalities in both arms and legs. Gait/Posture: steady gait     ROS : A 10-14 system review of constitutional, cardiovascular, respiratory, GI, eyes, , ENT, musculoskeletal, endocrine, hematological, skin, SHEENT, genitourinary, psychiatric and neurologic systems was obtained and updated today which is unremarkable except as mentioned in my HPI      Medical decision making:    A.  Problems (any 1)    High:    [] Acute/Chronic Illness/injury posing threat to life or bodily function:    []

## 2023-12-01 PROCEDURE — 93294 REM INTERROG EVL PM/LDLS PM: CPT | Performed by: INTERNAL MEDICINE

## 2023-12-01 PROCEDURE — 93296 REM INTERROG EVL PM/IDS: CPT | Performed by: INTERNAL MEDICINE

## 2024-02-09 RX ORDER — LISINOPRIL 20 MG/1
20 TABLET ORAL DAILY
Qty: 30 TABLET | Refills: 5 | Status: SHIPPED | OUTPATIENT
Start: 2024-02-09

## 2024-08-23 RX ORDER — LISINOPRIL 20 MG/1
20 TABLET ORAL DAILY
Qty: 30 TABLET | Refills: 5 | Status: SHIPPED | OUTPATIENT
Start: 2024-08-23

## 2024-08-23 NOTE — TELEPHONE ENCOUNTER
Received refill request for lisinopril from [facility] pharmacy.    Last ov: 10/02/2023 NPSR    Last Refill: 02/09/2024 #30 w/ 5    Next appointment: 10/22/2024 ESTE[place]ANIA

## 2024-10-16 NOTE — PROGRESS NOTES
[facility]   Electrophysiology Follow up   Date: 10/22/2024  I had the privilege of visiting Vidal Cobos in the office.       CC: pacemaker   HPI: Vidal Cobos is a 73 y.o. male with a past medical history of HTN, CAD, asthma/COPD, and DM.     S/p dual chamber Medtronic PPM (4/21/23) for sinus node dysfunction and long pauses.   He developed left arm swelling s/p PPM placement and found to have acute DVT.     Vidal presents to office today in annual follow up. He has no cardiac complaints today. Patient denies lightheadedness, dizziness, chest pain, palpitations, orthopnea, edema, presyncope or syncope. He had some atrial fibrillation on his device interrogation today which is new for him.      Assessment and plan:   Paroxysmal Atrial Fibrillation: New diagnosis   ECG today shows SR   4 AT/AF w/ <0.1% - last on 9/18/24 longest 1 hr 44 minutes per device interrogation   [id number] Score for Atrial Fibrillation Stroke Risk 4(age1, HTN, DM) We discussed the risk for stroke in regards to atrial fibrillation. Start eliquis 5 mg BID. Will monitor for signs and symptoms of bleeding  We discussed treatment options including antiarrhythmics, rate control with anticoagulation, and ablation.   We discussed progressive nature of atrial fibrillation. Treatment success decreases when AF becomes persistent and last more than 6 months.    Antiarrhythmic therapy, side effects, benefits and alternative discussed.     Atrial fibrillation ablation procedure was discussed.        Discussed monitoring his rhythm regularly at home, exercising to lose weight and using his CPAP       If increasing Afib burden, ablation will be considered.     Sinus Node Dysfunction  - S/p Dual chamber Medtronic PPM (4/21/23, )  - Interrogation today shows: 13.2 years remaining, AP 35.5% ,  0.2%,  <0.1% AT/AF burden per device interrogation today, Underlying SB @ 50 bpm, presenting APVS @ 86 bpm   - SVT-ST episodes noted. 1 NSVT that is a

## 2024-10-22 ENCOUNTER — OFFICE VISIT (OUTPATIENT)
Dept: CARDIOLOGY CLINIC | Age: 74
End: 2024-10-22
Payer: MEDICARE

## 2024-10-22 ENCOUNTER — NURSE ONLY (OUTPATIENT)
Dept: CARDIOLOGY CLINIC | Age: 74
End: 2024-10-22

## 2024-10-22 VITALS
HEART RATE: 64 BPM | SYSTOLIC BLOOD PRESSURE: 118 MMHG | WEIGHT: 204.8 LBS | OXYGEN SATURATION: 97 % | BODY MASS INDEX: 31.04 KG/M2 | HEIGHT: 68 IN | DIASTOLIC BLOOD PRESSURE: 72 MMHG

## 2024-10-22 DIAGNOSIS — I25.10 CORONARY ARTERY DISEASE INVOLVING NATIVE HEART WITHOUT ANGINA PECTORIS, UNSPECIFIED VESSEL OR LESION TYPE: ICD-10-CM

## 2024-10-22 DIAGNOSIS — I49.5 SINUS NODE DYSFUNCTION (HCC): Primary | ICD-10-CM

## 2024-10-22 DIAGNOSIS — R55 SYNCOPE, UNSPECIFIED SYNCOPE TYPE: ICD-10-CM

## 2024-10-22 DIAGNOSIS — I10 HYPERTENSION, UNSPECIFIED TYPE: ICD-10-CM

## 2024-10-22 DIAGNOSIS — Z95.0 PACEMAKER: ICD-10-CM

## 2024-10-22 DIAGNOSIS — Z95.0 PACEMAKER: Primary | ICD-10-CM

## 2024-10-22 DIAGNOSIS — I49.5 SINUS NODE DYSFUNCTION (HCC): ICD-10-CM

## 2024-10-22 PROCEDURE — 99214 OFFICE O/P EST MOD 30 MIN: CPT | Performed by: INTERNAL MEDICINE

## 2024-10-22 PROCEDURE — 1036F TOBACCO NON-USER: CPT | Performed by: INTERNAL MEDICINE

## 2024-10-22 PROCEDURE — G8417 CALC BMI ABV UP PARAM F/U: HCPCS | Performed by: INTERNAL MEDICINE

## 2024-10-22 PROCEDURE — 1123F ACP DISCUSS/DSCN MKR DOCD: CPT | Performed by: INTERNAL MEDICINE

## 2024-10-22 PROCEDURE — G8484 FLU IMMUNIZE NO ADMIN: HCPCS | Performed by: INTERNAL MEDICINE

## 2024-10-22 PROCEDURE — 93000 ELECTROCARDIOGRAM COMPLETE: CPT | Performed by: INTERNAL MEDICINE

## 2024-10-22 PROCEDURE — 3074F SYST BP LT 130 MM HG: CPT | Performed by: INTERNAL MEDICINE

## 2024-10-22 PROCEDURE — G8427 DOCREV CUR MEDS BY ELIG CLIN: HCPCS | Performed by: INTERNAL MEDICINE

## 2024-10-22 PROCEDURE — 3078F DIAST BP <80 MM HG: CPT | Performed by: INTERNAL MEDICINE

## 2024-10-22 PROCEDURE — 3017F COLORECTAL CA SCREEN DOC REV: CPT | Performed by: INTERNAL MEDICINE

## 2024-10-22 NOTE — PATIENT INSTRUCTIONS
by state. For more information, visit www.rxassist.org.  [employer]wd[employer]althe™  [employer] lets you search for patient assistance information. In addition, some companies allow physicians to submit applications electronically through this site. For more information, visit www.bluepulse.SmartNews.    Independent [facility] Patient Assistance Foundations  Assistance may be available through independent foundations such as those listed below. This is not a comprehensive list, and other foundations may be able to help. Foundations can provide a variety of assistance types: co-pay, transportation, premium, patient education, etc. These foundations are not associated with [employer]; specific details and eligibility requirements can be found directly on the foundations' websites.  The Assistance Fund        https://LANDBAYcares.org/        2-448-776-0760  [employer] [employer] Assistance Foundation        www.cancercare.org        6-139 015-2700  Good Days Fund        www.IdenIvegooddays.org        0-978-535-7903  [employer]        www.healthIntervolvefoundation.org        2-326-439-4596  Leukemia & Lymphoma Society        www.lls.org        0-590-349-5848  Patient Access Network [facility]        www.PANfoundation.org        5-597-082-7748   Patient Advocate Foundation        www.patientadvocate.org        1-110.574.5165   [employer]        www.patientservicesinc.org        1-385.167.9523

## 2025-02-12 RX ORDER — LISINOPRIL 20 MG/1
20 TABLET ORAL DAILY
Qty: 30 TABLET | Refills: 5 | Status: SHIPPED | OUTPATIENT
Start: 2025-02-12

## 2025-02-12 RX ORDER — APIXABAN 5 MG/1
5 TABLET, FILM COATED ORAL 2 TIMES DAILY
Qty: 180 TABLET | Refills: 1 | Status: SHIPPED | OUTPATIENT
Start: 2025-02-12

## 2025-02-12 NOTE — TELEPHONE ENCOUNTER
Received refill request for ELIQUIS 5 MG TABS tablet  from [facility] pharmacy.     Last OV: 10/22/2024 RMM    Next OV: 4/10/2025 RMM    Last Labs:     Last Filled: 10/22/2024 RMM

## 2025-02-12 NOTE — TELEPHONE ENCOUNTER
Received refill request for  lisinopril (PRINIVIL;ZESTRIL) 20 MG tablet  from [facility] pharmacy.     Last OV: 12/22/2024 RMM    Next OV: 4/10/2025 RMM    Last Labs:     Last Filled: 8/23/2024 NPSR

## 2025-04-08 PROBLEM — I48.0 PAF (PAROXYSMAL ATRIAL FIBRILLATION) (HCC): Status: ACTIVE | Noted: 2025-04-08

## 2025-04-08 NOTE — PROGRESS NOTES
treatment, procedures, and medical decision making performed by me    The patient (or guardian, if applicable) and other individuals in attendance with the patient were advised that Artificial Intelligence will be utilized during this visit to record, process the conversation to generate a clinical note, and support improvement of the AI technology. The patient (or guardian, if applicable) and other individuals in attendance at the appointment consented to the use of AI, including the recording.      NOTE: This report was transcribed using voice recognition software. Every effort was made to ensure accuracy, however, inadvertent computerized transcription errors may be present.     Kam Dueñas MD, MPH  [facility]   Office: [phone]  Fax: [phone]

## 2025-04-10 ENCOUNTER — CLINICAL SUPPORT (OUTPATIENT)
Dept: CARDIOLOGY CLINIC | Age: 75
End: 2025-04-10

## 2025-04-10 ENCOUNTER — OFFICE VISIT (OUTPATIENT)
Dept: CARDIOLOGY CLINIC | Age: 75
End: 2025-04-10

## 2025-04-10 VITALS
TEMPERATURE: 98.6 F | SYSTOLIC BLOOD PRESSURE: 120 MMHG | OXYGEN SATURATION: 97 % | DIASTOLIC BLOOD PRESSURE: 60 MMHG | BODY MASS INDEX: 32.06 KG/M2 | WEIGHT: 210.8 LBS | HEART RATE: 84 BPM

## 2025-04-10 DIAGNOSIS — Z95.0 PACEMAKER: Primary | ICD-10-CM

## 2025-04-10 DIAGNOSIS — I49.5 SINUS NODE DYSFUNCTION (HCC): ICD-10-CM

## 2025-04-10 DIAGNOSIS — I48.0 PAF (PAROXYSMAL ATRIAL FIBRILLATION) (HCC): ICD-10-CM

## 2025-04-10 DIAGNOSIS — I25.10 CORONARY ARTERY DISEASE INVOLVING NATIVE HEART WITHOUT ANGINA PECTORIS, UNSPECIFIED VESSEL OR LESION TYPE: Primary | ICD-10-CM

## 2025-04-10 DIAGNOSIS — I10 HYPERTENSION, UNSPECIFIED TYPE: ICD-10-CM

## 2025-05-07 RX ORDER — LISINOPRIL 20 MG/1
20 TABLET ORAL DAILY
Qty: 90 TABLET | Refills: 1 | Status: SHIPPED | OUTPATIENT
Start: 2025-05-07

## 2025-05-07 NOTE — TELEPHONE ENCOUNTER
Received refill request for  LISINOPRIL 20 MG TABS 20 Tablet  from [facility]  pharmacy.     Last OV: 4/10/25    Next OV: none    Last Labs: EKG 4/10/25    Last Filled: 2/12/25

## 2025-08-29 PROCEDURE — 93296 REM INTERROG EVL PM/IDS: CPT | Performed by: INTERNAL MEDICINE

## 2025-08-29 PROCEDURE — 93294 REM INTERROG EVL PM/LDLS PM: CPT | Performed by: INTERNAL MEDICINE
